# Patient Record
(demographics unavailable — no encounter records)

---

## 2017-01-01 NOTE — HP
- Maternal History


HBSAG: Negative


Date: 17


RPR: Negative


Date: 17


Group B Strep: Negative


HIV: Negative





- Maternal Risks


OB Risks:  2011, SAB @ 12weeks(+for DS) 2014, Diabetic taking 

metformin.





 Data





- Admission


Date of Admission: 17


Admission Time: 04:00


Date of Delivery: 17


Time of Delivery: 03:38


Wks Gestation by Sono: 38.1


Infant Gender: Female


Type of Delivery: 


Apgar Score @1 Minute: 9


Apgar score @ 5 Minutes: 9


Birth Weight: 3.09 kg


Birth Length: 19 in


Head Circumference, Admission: 34.0


Chest Circumference: 33.0


Abdominal Girth: 29.0





- Vital Signs


  ** Left Upper Arm


Blood Pressure: 55/36


Blood Pressure Mean: 42





  ** Left Calf


Blood Pressure: 51/37


Blood Pressure Mean: 41





  ** Right Upper Arm


Blood Pressure: 55/39


Blood Pressure Mean: 44





  ** Right Calf


Blood Pressure: 57/35


Blood Pressure Mean: 42





- Labs


Labs: 


 Baby's Blood Type, Markie











Cord Blood Type  B POSITIVE   17  03:40    


 


CAIO, Poly Interpret  Negative  (NEGATIVE)   17  03:40    














- [facility] Screening


 Screening Card Number: [id number]





 Infant, Physical Exam





- [place] Infant, Admission Exam


Birth Weight: 3.09 kg


Birth Length: 19 in


Chest Circumference: 33.0


Initial Vital Signs: 


 Initial Vital Signs











Temp Pulse Resp


 


 98.0 F   135   35 


 


 17 04:26  17 04:26  17 04:26











General Appearance: Yes: Well flexed, Full ROM, Spontaneous movements, Pink


Skin: Yes: No Abnormalities


Head: Yes: No Abnormalities (AFOF)


Eyes: Yes: Clear, Pupils equal, CHU, Red reflex present


Ears: Yes: Symmetrical


Nose: Yes: Nares patent


Mouth: Yes: No Abnormalities


Chest: Yes: Symmetrical, Clavicles intact


Lungs/Respiratory: Yes: Clear, Bilateral good air entry


Cardiac: Yes: S1, S2, Peripheral pulses strong, Capillary refill immediat.  No: 

Murmur


Abdomen: Yes: Umb Ves, 2 artery 1 vein


Gastrointestinal: Yes: Active bowel sounds.  No: Hepatomegaly, Splenomegaly


Genitalia: No Abnormalities


Genitalia, Female: Yes: Labia Normal, Urethra Patent, Vagina Patent


Anus: Yes: Patent


Extremities: Yes: No Abnormalities (Full ROM all extremities), 10 Fingers, 10 

Toes


Femoral Pulse: Strong


Ortolani Test: Negative


Mcarthur Test: Negative


Spine: Yes: Other (Spine intact)


Reflexes: Adama: Present, Rooting: Present, Sucking: Present


Neuro: Yes: Alert, Active





Problem List





- Problems


(1) Single liveborn infant delivered vaginally


Assessment/Plan: 


encouraged breast feeding


Code(s): Z38.00 - SINGLE LIVEBORN INFANT, DELIVERED VAGINALLY

## 2017-01-01 NOTE — DS
- Maternal History


HBSAG: Negative


Date: 17


RPR: Negative


Date: 17


Group B Strep: Negative


HIV: Negative





- Maternal Risks


OB Risks:  2011, SAB @ 12weeks(+for DS) 2014, Diabetic taking 

metformin.





 Data





- Admission


Date of Admission: 17


Admission Time: 04:00


Date of Delivery: 17


Time of Delivery: 03:38


Wks Gestation by Sono: 38.1


Infant Gender: Female


Type of Delivery: 


Apgar Score @1 Minute: 9


Apgar score @ 5 Minutes: 9


Birth Weight: 3.09 kg


Birth Length: 19 in


Head Circumference, Admission: 34.0


Chest Circumference: 33.0


Abdominal Girth: 29.0





- Vital Signs


  ** Left Upper Arm


Blood Pressure: 55/36


Blood Pressure Mean: 42





  ** Left Calf


Blood Pressure: 51/37


Blood Pressure Mean: 41





  ** Right Upper Arm


Blood Pressure: 55/39


Blood Pressure Mean: 44





  ** Right Calf


Blood Pressure: 57/35


Blood Pressure Mean: 42





- Hearing Screen


Left Ear: Passed


Right Ear: Passed


Hearing Screen Complete: 17





- Labs


Labs: 


 Baby's Blood Type, Markie











Cord Blood Type  B POSITIVE   17  03:40    


 


CAIO, Poly Interpret  Negative  (NEGATIVE)   17  03:40    














- [facility] Screening


 Screening Card Number: [id number]





Amonate PE, Discharge





- Physical Exam


Last Weight Documented: 2.977 kg


Vital Signs: 


 Vital Signs











Temperature  98.4 F   17 02:00


 


Pulse Rate  135   17 04:26


 


Respiratory Rate  35   17 04:26


 


Blood Pressure  55/36   17 21:05


 


O2 Sat by Pulse Oximetry (%)  100   17 04:38








 SpO2





Preductal SpO2, Right Arm        100


Postductal SpO2 [Left Leg]       100








General Appearance: Yes: Well flexed, Full ROM, Spontaneous movements, Pink


Skin: Yes: No Abnormalities


Head: Yes: No Abnormalities (AFOF)


Eyes: Yes: Clear, Pupils equal, CHU, Red reflex present


Ears: Yes: Symmetrical


Nose: Yes: Nares patent


Mouth: Yes: No Abnormalities


Chest: Yes: Symmetrical, Clavicles intact


Lungs/Respiratory: Yes: Clear, Bilateral good air entry


Cardiac: Yes: S1, S2, Peripheral pulses strong, Capillary refill immediat.  No: 

Murmur


Abdomen: Yes: Umb Ves, 2 artery 1 vein


Gastrointestinal: Yes: Active bowel sounds.  No: Hepatomegaly, Splenomegaly


Genitalia: No Abnormalities


Genitalia, Female: Yes: Labia Normal, Urethra Patent, Vagina Patent


Anus: Yes: Patent


Extremities: Yes: No Abnormalities (Full ROM all extremities), 10 Fingers, 10 

Toes


Spine: Yes: Other (Spine intact)


Reflexes: Adama: Present, Rooting: Present, Sucking: Present


Neuro: Yes: Alert, Active


Preductal SpO2, Right Arm: 100


  ** Left Leg


Postductal SpO2: 100





Problem List





- Problems


(1) Single liveborn infant delivered vaginally


Assessment/Plan: 


follow up in 2-3 days 


Code(s): Z38.00 - SINGLE LIVEBORN INFANT, DELIVERED VAGINALLY   





Discharge Summary


Reason For Visit: 


Current Active Problems





Single liveborn infant delivered vaginally (Acute)








Condition: Good





- Instructions


Diet, Activity, Other Instructions: 


INFANT TO BE SEEN BY PEDIATRICIAN MONDAY OR TUESDAY - CALL FOR APPOINTMENT.


Referrals: 


Yessy Garrett MD [Staff Physician] - 


Disposition: HOME